# Patient Record
Sex: FEMALE
[De-identification: names, ages, dates, MRNs, and addresses within clinical notes are randomized per-mention and may not be internally consistent; named-entity substitution may affect disease eponyms.]

---

## 2020-12-11 ENCOUNTER — NURSE TRIAGE (OUTPATIENT)
Dept: OTHER | Facility: CLINIC | Age: 35
End: 2020-12-11

## 2020-12-11 NOTE — TELEPHONE ENCOUNTER
Tested Negative Dec 3 and was (-). Exposed to [employer] on Dec 1  Started with symptoms on Dec 2nd. Nausea, abdominal pain, diarrhea, cough and headache. Today- is feeling better. Needs MD noted to go back to work. Patient hung up on rn before finishing triage.      Reason for Disposition   RN needs further essential information from caller in order to complete triage    Protocols used: INFORMATION ONLY CALL - NO TRIAGE-ADULT-